# Patient Record
Sex: MALE | Race: OTHER
[De-identification: names, ages, dates, MRNs, and addresses within clinical notes are randomized per-mention and may not be internally consistent; named-entity substitution may affect disease eponyms.]

---

## 2019-04-09 ENCOUNTER — HOSPITAL ENCOUNTER (EMERGENCY)
Dept: HOSPITAL 54 - ER | Age: 34
Discharge: LEFT BEFORE DISCHARGE | End: 2019-04-09
Payer: SELF-PAY

## 2019-04-09 VITALS — SYSTOLIC BLOOD PRESSURE: 122 MMHG | DIASTOLIC BLOOD PRESSURE: 71 MMHG

## 2019-04-09 VITALS — WEIGHT: 165 LBS | HEIGHT: 67 IN | BODY MASS INDEX: 25.9 KG/M2

## 2019-04-09 DIAGNOSIS — T40.691A: Primary | ICD-10-CM
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
## 2019-04-09 NOTE — NUR
pt bibra to er bed 14. per report, pt was found at a bathroom of [facility] 
sleeping. pt upon arrival is arousable and verbally responsive admits to 
saboxone use. pt easily goes back to sleep. placed on monitor. stable vitals. 
awaiting md riggs.